# Patient Record
Sex: FEMALE | Race: WHITE | NOT HISPANIC OR LATINO | URBAN - METROPOLITAN AREA
[De-identification: names, ages, dates, MRNs, and addresses within clinical notes are randomized per-mention and may not be internally consistent; named-entity substitution may affect disease eponyms.]

---

## 2022-09-21 ENCOUNTER — EMERGENCY (EMERGENCY)
Facility: HOSPITAL | Age: 27
LOS: 1 days | Discharge: HOME | End: 2022-09-21
Attending: EMERGENCY MEDICINE
Payer: MEDICAID

## 2022-09-21 VITALS
OXYGEN SATURATION: 99 % | RESPIRATION RATE: 18 BRPM | HEART RATE: 87 BPM | SYSTOLIC BLOOD PRESSURE: 114 MMHG | DIASTOLIC BLOOD PRESSURE: 78 MMHG

## 2022-09-21 VITALS
TEMPERATURE: 99 F | RESPIRATION RATE: 16 BRPM | SYSTOLIC BLOOD PRESSURE: 111 MMHG | DIASTOLIC BLOOD PRESSURE: 70 MMHG | HEIGHT: 68 IN | WEIGHT: 132.28 LBS | OXYGEN SATURATION: 98 % | HEART RATE: 86 BPM

## 2022-09-21 LAB
ALBUMIN SERPL ELPH-MCNC: 4.5 G/DL — SIGNIFICANT CHANGE UP (ref 3.3–5)
ALP SERPL-CCNC: 73 U/L — SIGNIFICANT CHANGE UP (ref 40–120)
ALT FLD-CCNC: 17 U/L — SIGNIFICANT CHANGE UP (ref 10–45)
ANION GAP SERPL CALC-SCNC: 10 MMOL/L — SIGNIFICANT CHANGE UP (ref 5–17)
AST SERPL-CCNC: 16 U/L — SIGNIFICANT CHANGE UP (ref 10–40)
BASOPHILS # BLD AUTO: 0.02 K/UL — SIGNIFICANT CHANGE UP (ref 0–0.2)
BASOPHILS NFR BLD AUTO: 0.3 % — SIGNIFICANT CHANGE UP (ref 0–2)
BILIRUB SERPL-MCNC: 0.3 MG/DL — SIGNIFICANT CHANGE UP (ref 0.2–1.2)
BUN SERPL-MCNC: 11 MG/DL — SIGNIFICANT CHANGE UP (ref 7–23)
CALCIUM SERPL-MCNC: 9.2 MG/DL — SIGNIFICANT CHANGE UP (ref 8.4–10.5)
CHLORIDE SERPL-SCNC: 104 MMOL/L — SIGNIFICANT CHANGE UP (ref 96–108)
CO2 SERPL-SCNC: 25 MMOL/L — SIGNIFICANT CHANGE UP (ref 22–31)
CREAT SERPL-MCNC: 0.66 MG/DL — SIGNIFICANT CHANGE UP (ref 0.5–1.3)
EGFR: 125 ML/MIN/1.73M2 — SIGNIFICANT CHANGE UP
EOSINOPHIL # BLD AUTO: 0.2 K/UL — SIGNIFICANT CHANGE UP (ref 0–0.5)
EOSINOPHIL NFR BLD AUTO: 3 % — SIGNIFICANT CHANGE UP (ref 0–6)
FLUAV AG NPH QL: SIGNIFICANT CHANGE UP
FLUBV AG NPH QL: SIGNIFICANT CHANGE UP
GLUCOSE SERPL-MCNC: 107 MG/DL — HIGH (ref 70–99)
HCG SERPL-ACNC: <2 MIU/ML — SIGNIFICANT CHANGE UP
HCT VFR BLD CALC: 38.5 % — SIGNIFICANT CHANGE UP (ref 34.5–45)
HGB BLD-MCNC: 12.9 G/DL — SIGNIFICANT CHANGE UP (ref 11.5–15.5)
IMM GRANULOCYTES NFR BLD AUTO: 0.3 % — SIGNIFICANT CHANGE UP (ref 0–0.9)
LYMPHOCYTES # BLD AUTO: 2.78 K/UL — SIGNIFICANT CHANGE UP (ref 1–3.3)
LYMPHOCYTES # BLD AUTO: 41.7 % — SIGNIFICANT CHANGE UP (ref 13–44)
MCHC RBC-ENTMCNC: 29.7 PG — SIGNIFICANT CHANGE UP (ref 27–34)
MCHC RBC-ENTMCNC: 33.5 GM/DL — SIGNIFICANT CHANGE UP (ref 32–36)
MCV RBC AUTO: 88.5 FL — SIGNIFICANT CHANGE UP (ref 80–100)
MONOCYTES # BLD AUTO: 0.34 K/UL — SIGNIFICANT CHANGE UP (ref 0–0.9)
MONOCYTES NFR BLD AUTO: 5.1 % — SIGNIFICANT CHANGE UP (ref 2–14)
NEUTROPHILS # BLD AUTO: 3.31 K/UL — SIGNIFICANT CHANGE UP (ref 1.8–7.4)
NEUTROPHILS NFR BLD AUTO: 49.6 % — SIGNIFICANT CHANGE UP (ref 43–77)
NRBC # BLD: 0 /100 WBCS — SIGNIFICANT CHANGE UP (ref 0–0)
PLATELET # BLD AUTO: 249 K/UL — SIGNIFICANT CHANGE UP (ref 150–400)
POTASSIUM SERPL-MCNC: 4 MMOL/L — SIGNIFICANT CHANGE UP (ref 3.5–5.3)
POTASSIUM SERPL-SCNC: 4 MMOL/L — SIGNIFICANT CHANGE UP (ref 3.5–5.3)
PROT SERPL-MCNC: 7.2 G/DL — SIGNIFICANT CHANGE UP (ref 6–8.3)
RBC # BLD: 4.35 M/UL — SIGNIFICANT CHANGE UP (ref 3.8–5.2)
RBC # FLD: 12.2 % — SIGNIFICANT CHANGE UP (ref 10.3–14.5)
RSV RNA NPH QL NAA+NON-PROBE: SIGNIFICANT CHANGE UP
SARS-COV-2 RNA SPEC QL NAA+PROBE: SIGNIFICANT CHANGE UP
SODIUM SERPL-SCNC: 139 MMOL/L — SIGNIFICANT CHANGE UP (ref 135–145)
WBC # BLD: 6.67 K/UL — SIGNIFICANT CHANGE UP (ref 3.8–10.5)
WBC # FLD AUTO: 6.67 K/UL — SIGNIFICANT CHANGE UP (ref 3.8–10.5)

## 2022-09-21 PROCEDURE — 70450 CT HEAD/BRAIN W/O DYE: CPT | Mod: MA

## 2022-09-21 PROCEDURE — 87637 SARSCOV2&INF A&B&RSV AMP PRB: CPT

## 2022-09-21 PROCEDURE — 84702 CHORIONIC GONADOTROPIN TEST: CPT

## 2022-09-21 PROCEDURE — 70450 CT HEAD/BRAIN W/O DYE: CPT | Mod: 26,MA

## 2022-09-21 PROCEDURE — 99285 EMERGENCY DEPT VISIT HI MDM: CPT | Mod: 25

## 2022-09-21 PROCEDURE — 93005 ELECTROCARDIOGRAM TRACING: CPT

## 2022-09-21 PROCEDURE — 85025 COMPLETE CBC W/AUTO DIFF WBC: CPT

## 2022-09-21 PROCEDURE — 96374 THER/PROPH/DIAG INJ IV PUSH: CPT

## 2022-09-21 PROCEDURE — 99285 EMERGENCY DEPT VISIT HI MDM: CPT | Mod: GC

## 2022-09-21 PROCEDURE — 99285 EMERGENCY DEPT VISIT HI MDM: CPT

## 2022-09-21 PROCEDURE — 80053 COMPREHEN METABOLIC PANEL: CPT

## 2022-09-21 RX ORDER — DIAZEPAM 5 MG
5 TABLET ORAL ONCE
Refills: 0 | Status: DISCONTINUED | OUTPATIENT
Start: 2022-09-21 | End: 2022-09-21

## 2022-09-21 RX ORDER — SODIUM CHLORIDE 9 MG/ML
1000 INJECTION INTRAMUSCULAR; INTRAVENOUS; SUBCUTANEOUS ONCE
Refills: 0 | Status: COMPLETED | OUTPATIENT
Start: 2022-09-21 | End: 2022-09-21

## 2022-09-21 RX ORDER — METOCLOPRAMIDE HCL 10 MG
10 TABLET ORAL ONCE
Refills: 0 | Status: COMPLETED | OUTPATIENT
Start: 2022-09-21 | End: 2022-09-21

## 2022-09-21 RX ADMIN — Medication 10 MILLIGRAM(S): at 13:53

## 2022-09-21 RX ADMIN — Medication 5 MILLIGRAM(S): at 15:57

## 2022-09-21 RX ADMIN — SODIUM CHLORIDE 2000 MILLILITER(S): 9 INJECTION INTRAMUSCULAR; INTRAVENOUS; SUBCUTANEOUS at 13:53

## 2022-09-21 NOTE — ED PROVIDER NOTE - OBJECTIVE STATEMENT
This is a 27-year-old female otherwise well no past medical history.  She is coming in with right-sided headache for about 2 to 3 weeks not sudden onset.  Since then she has had occasional palpitations and feels like her gait is unstable.  She feels like she is leaning to 1 side.  She sometimes gets tingling feeling in all 4 extremities when her palpitations are happening.  There is no chest pain or shortness of breath.  There is no leg swelling.  She is never had this before.  No head injury or trauma.  She feels like there might be a little bump underneath the skin of her scalp.  The pain is moderate to severe intermittent and pulsating. This is a 27-year-old female otherwise well no past medical history.  She is coming in with right-sided headache for about 2 to 3 weeks not sudden onset.  Since then she has had occasional palpitations and feels like her gait is unstable.  She feels like she is leaning to 1 side.  She sometimes gets tingling feeling in all 4 extremities when her palpitations are happening.  There is no chest pain or shortness of breath.  There is no leg swelling.  She is never had this before.  No head injury or trauma.  She feels like there might be a little bump underneath the skin of her scalp.  The pain is moderate to severe intermittent and pulsating. No double vision or trouble swallowing. No neck injury.

## 2022-09-21 NOTE — ED PROVIDER NOTE - CARE PROVIDER_API CALL
Brennan Acuna)  Neurology; Neurophysiology  [address], [address]  [place] [address]  Phone: [phone]  Fax: [phone]  Follow Up Time: 4-6 Days

## 2022-09-21 NOTE — CONSULT NOTE ADULT - SUBJECTIVE AND OBJECTIVE BOX
Neurology - Consult Note    -  Spectra: [id number] ([facility]), [id number] ([facility])  -    HPI: Patient FERCHO VASQUEZ is a 27y (1995) wo/man with no significant PMH except TMJ? has presented to the ED due to 4-5 months of episodes of "dizziness" (actually lightheadedness) with associated R ear pressure/pain, tingling in extremities (bilateral toes to knees, bilateral fingers to neck) and bilateral blurry vision. Pt states that his lightheadedness gets worse with any changes in position (sitting up, also to the left and to the right). States she came in now because of worsening of symptoms. Has been having trouble ambulating. Pt currently denies any significant HA, fever/chills, n/v, numbness, tingling, focal weakness, changes in vision, hearing loss, tinnitus head trauma.       Review of Systems:    CONSTITUTIONAL: No fevers or chills  EYES AND ENT: No visual changes or no throat pain   NECK: No pain or stiffness  RESPIRATORY: No hemoptysis or shortness of breath  NEUROLOGICAL: +As stated in HPI above  SKIN: No itching, burning, rashes, or lesions   All other review of systems is negative unless indicated above.    Allergies:      PMHx/PSHx/Family Hx: As above, otherwise see below       Social Hx:  No current use of tobacco, alcohol, or illicit drugs  Lives with ***    Medications:  MEDICATIONS  (STANDING):    MEDICATIONS  (PRN):      Vitals:  T(C): 37.1 (09-21-22 @ 09:48), Max: 37.1 (09-21-22 @ 09:48)  HR: 87 (09-21-22 @ 15:30) (86 - 88)  BP: 114/78 (09-21-22 @ 15:30) (111/70 - 114/78)  RR: 18 (09-21-22 @ 15:30) (16 - 18)  SpO2: 99% (09-21-22 @ 15:30) (98% - 99%)    Physical Examination:   General - NAD  Cardiovascular - Peripheral pulses palpable, no edema  Eyes - Fundoscopy not performed due to safety precautions in the setting of the COVID-19 pandemic    Neurologic Exam:  Mental status - Awake, Alert, Oriented to person, place, and time. Speech fluent, repetition and naming intact. Follows simple and complex commands. Attention/concentration, recent and remote memory (including registration and recall), and fund of knowledge intact    Cranial nerves - PERRLA, VFF, EOMI, face sensation (V1-V3) intact b/l, facial strength intact without asymmetry b/l, hearing intact b/l, palate with symmetric elevation, trapezius OR sternocleidomastiod 5/5 strength b/l, tongue midline on protrusion with full lateral movement. Subjective R ear full ness.   Head impulse positive to left     Motor - Normal bulk and tone throughout. No pronator drift.  Strength testing            Deltoid      Biceps      Triceps     Wrist Extension    Wrist Flexion     Interossei         R            5                 5               5                     5                              5                        5                 5  L             5                 5               5                     5                              5                        5                 5              Hip Flexion    Hip Extension    Knee Flexion    Knee Extension    Dorsiflexion    Plantar Flexion  R              5                           5                       5                           5                            5                          5  L              5                           5                        5                           5                            5                          5    Sensation - Intact to light touch in all extremities    DTR's -             Biceps      Triceps     Brachioradialis      Patellar    Ankle    Toes/plantar response  R             2+             2+                  2+                       2+            2+                 Down  L              2+             2+                 2+                        2+           2+                 Down    Coordination - Finger to Nose intact b/l.     Gait and station - Deferred due to fall risk  Labs:                        12.9   6.67  )-----------( 249      ( 21 Sep 2022 11:08 )             38.5     09-21    139  |  104  |  11  ----------------------------<  107<H>  4.0   |  25  |  0.66    Ca    9.2      21 Sep 2022 11:08    TPro  7.2  /  Alb  4.5  /  TBili  0.3  /  DBili  x   /  AST  16  /  ALT  17  /  AlkPhos  73  09-21    CAPILLARY BLOOD GLUCOSE        LIVER FUNCTIONS - ( 21 Sep 2022 11:08 )  Alb: 4.5 g/dL / Pro: 7.2 g/dL / ALK PHOS: 73 U/L / ALT: 17 U/L / AST: 16 U/L / GGT: x               CSF:                  Radiology:  CT Head No Cont:  (21 Sep 2022 12:10)      FINDINGS:    There is no acute intracranial hemorrhage or major vascular distribution   infarction.    Linear hypoattenuation overlying the aris, artifactual due to calvaria   beam hardening.    There is no edema or midline shift. The basal cisterns are patent.  There is no hydrocephalus.  No extra-axial collection is seen.    Normal pituitary gland configuration. Nonspecific pineal gland   calcifications.  No cerebellar tonsillar ectopia/herniation.    The visualized orbits and mastoid/middle ear cavities are clear.   Partially imaged paranasal sinuses are clear.    No acute calvarial fracture is identified.      IMPRESSION:    No CT evidence for acute intracranial hemorrhage, territorial infarction   or mass effect. If the patient has persistent symptoms and/or new focal   neurologic deficits, consider further evaluation with MRI.   Neurology - Consult Note    -  Spectra: [id number] ([facility]), [id number] ([facility])  -    HPI: Patient FERCHO VSAQUEZ is a 27y (1995) wo/man with no significant PMH except TMJ? has presented to the ED due to 4-5 months of episodes of "dizziness" (actually lightheadedness) with associated R ear pressure/pain, tingling in extremities (bilateral toes to knees, bilateral fingers to neck) and bilateral blurry vision. Pt states that his lightheadedness gets worse with any changes in position (sitting up, also to the left and to the right). States she came in now because of worsening of symptoms. Has been having trouble ambulating. Pt currently denies any significant HA, fever/chills, n/v, numbness, tingling, focal weakness, changes in vision, hearing loss, tinnitus head trauma.       Review of Systems:    CONSTITUTIONAL: No fevers or chills  EYES AND ENT: No visual changes or no throat pain   NECK: No pain or stiffness  RESPIRATORY: No hemoptysis or shortness of breath  NEUROLOGICAL: +As stated in HPI above  SKIN: No itching, burning, rashes, or lesions   All other review of systems is negative unless indicated above.    Allergies: NKDA      PMHx/PSHx/Family Hx: As above, otherwise see below       Social Hx:  No current use of tobacco, alcohol, or illicit drugs    Medications:  MEDICATIONS  (STANDING):    MEDICATIONS  (PRN):      Vitals:  T(C): 37.1 (09-21-22 @ 09:48), Max: 37.1 (09-21-22 @ 09:48)  HR: 87 (09-21-22 @ 15:30) (86 - 88)  BP: 114/78 (09-21-22 @ 15:30) (111/70 - 114/78)  RR: 18 (09-21-22 @ 15:30) (16 - 18)  SpO2: 99% (09-21-22 @ 15:30) (98% - 99%)    Physical Examination:   General - NAD  Cardiovascular - Peripheral pulses palpable, no edema  Eyes - Fundoscopy not performed due to safety precautions in the setting of the COVID-19 pandemic    Neurologic Exam:  Mental status - Awake, Alert, Oriented to person, place, and time. Speech fluent, repetition and naming intact. Follows simple and complex commands. Attention/concentration, recent and remote memory (including registration and recall), and fund of knowledge intact    Cranial nerves - PERRLA, VFF, EOMI, face sensation (V1-V3) intact b/l, facial strength intact without asymmetry b/l, hearing intact b/l, palate with symmetric elevation, trapezius 5/5 strength b/l, tongue midline on protrusion with full lateral movement. Subjective R ear full ness.   Head impulse positive to left     Motor - Normal bulk and tone throughout. No pronator drift.  Strength testing            Deltoid      Biceps      Triceps     Wrist Extension    Wrist Flexion     Interossei         R            5                 5               5                     5                              5                        5                 5  L             5                 5               5                     5                              5                        5                 5              Hip Flexion    Hip Extension    Knee Flexion    Knee Extension    Dorsiflexion    Plantar Flexion  R              5                           5                       5                           5                            5                          5  L              5                           5                        5                           5                            5                          5    Sensation - Intact to light touch in all extremities    DTR's -             Biceps      Triceps     Brachioradialis      Patellar    Ankle    Toes/plantar response  R             2+             2+                  2+                       2+            2+                 Down  L              2+             2+                 2+                        2+           2+                 Down    Coordination - Finger to Nose intact b/l.     Gait and station - Not assessed due to fall risk in the ED  Labs:                        12.9   6.67  )-----------( 249      ( 21 Sep 2022 11:08 )             38.5     09-21    139  |  104  |  11  ----------------------------<  107<H>  4.0   |  25  |  0.66    Ca    9.2      21 Sep 2022 11:08    TPro  7.2  /  Alb  4.5  /  TBili  0.3  /  DBili  x   /  AST  16  /  ALT  17  /  AlkPhos  73  09-21    CAPILLARY BLOOD GLUCOSE        LIVER FUNCTIONS - ( 21 Sep 2022 11:08 )  Alb: 4.5 g/dL / Pro: 7.2 g/dL / ALK PHOS: 73 U/L / ALT: 17 U/L / AST: 16 U/L / GGT: x               CSF:                  Radiology:  CT Head No Cont:  (21 Sep 2022 12:10)      FINDINGS:    There is no acute intracranial hemorrhage or major vascular distribution   infarction.    Linear hypoattenuation overlying the aris, artifactual due to calvaria   beam hardening.    There is no edema or midline shift. The basal cisterns are patent.  There is no hydrocephalus.  No extra-axial collection is seen.    Normal pituitary gland configuration. Nonspecific pineal gland   calcifications.  No cerebellar tonsillar ectopia/herniation.    The visualized orbits and mastoid/middle ear cavities are clear.   Partially imaged paranasal sinuses are clear.    No acute calvarial fracture is identified.      IMPRESSION:    No CT evidence for acute intracranial hemorrhage, territorial infarction   or mass effect. If the patient has persistent symptoms and/or new focal   neurologic deficits, consider further evaluation with MRI.

## 2022-09-21 NOTE — ED PROVIDER NOTE - NSFOLLOWUPINSTRUCTIONS_ED_ALL_ED_FT
yes
IMPORTANT INSTRUCTIONS FROM Dr. GALLARDO:    Please follow up with your personal medical doctor in 24-48 hours. See neurologist within 1 week.   Bring results from today to your visit.    If you were advised to take any medications - be sure to review the package insert.    If your symptoms change, get worse or if you have any new symptoms, come to the ER right away.  If you have any questions, call the ER at the phone number on this page.

## 2022-09-21 NOTE — ED PROVIDER NOTE - PATIENT PORTAL LINK FT
You can access the FollowMyHealth Patient Portal offered by [facility] by registering at the following website: [email or web address]. By joining [facility]’s FollowMyHealth portal, you will also be able to view your health information using other applications (apps) compatible with our system.

## 2022-09-21 NOTE — ED ADULT NURSE NOTE - OBJECTIVE STATEMENT
27 y f came to the ed c/o dizziness and tingling in her hands for about 1 month. states she feels more dizzy when changing positions. when laying flat and sitting upright here in the ED patient became dizzy. patient is able to ambulate without assistance. also endorses palpitations. patient is a/ox3. denies fevers, chills, chest pain, sob. skin is warm and dry.

## 2022-09-21 NOTE — CONSULT NOTE ADULT - CONSULT REASON
intermittent episodes of "dizziness", extremities paresthesias, R ear pressure/pain with jaw clicking, bilateral blurry vision

## 2022-09-21 NOTE — ED PROVIDER NOTE - PROGRESS NOTE DETAILS
pt seen by neuro. dr burns. he rec for outpt f/u as pt duration of symptoms make this less worrisome and can see his partner in office for further w/u and care. feeling better from headache but still dizzy and wants to go home, reviewed case and results w pt, questions answered and pt understands, advised return precautions and care plan.

## 2022-09-21 NOTE — ED PROVIDER NOTE - CLINICAL SUMMARY MEDICAL DECISION MAKING FREE TEXT BOX
Is unclear the cause of the patient's symptoms.  Given the lengthy duration of the headache and the fact that she does not have a known history of migraines with associated reported gait instability.  We will do a CT scan to check for ICH.  I do not suspect subarachnoid given the nature of the symptoms.  Basic blood work CT head check for pregnancy symptomatic treatment for headaches EKG.

## 2022-09-21 NOTE — ED PROVIDER NOTE - NEUROLOGICAL, MLM
Alert and oriented, no focal deficits, no motor or sensory deficits.The gait is steady.  There is no pronator drift. Alert and oriented, no focal deficits, no motor or sensory deficits.The gait is steady.  There is no pronator drift. No nystagmus.

## 2022-09-21 NOTE — CONSULT NOTE ADULT - ATTENDING COMMENTS
HPI as per resident note, personally verified by me. Patient with 4-5 months of dizziness/lightheadedness that is worse with movement and sitting up and improving with lying down and not moving. She also has intermittent "pressure" in R ear and jaw and headache on that side. Has "clicking" sound in jaw. Also reports intermittent blurry vision b/l and tingling in her extremities in a stocking/glove distribution. Denies any rash over her R ear, discharge, or fevers.    Neurologic exam as per resident note with additions as below:  AAO x3, speech fluent  CN's II-XII intact. HINTS weakly (+) to the L  Strength 5/5 all  Sensation intact all  2+ all except for 2+ and brisk R KJ, downgoing b/l plantar response  FtN intact b/l  R masseter muscle with some crepitus and tenderness to palpation    A/P:  Dizziness  Headache  Blurry vision  Skin sensation disturbance    - Etiology for symptoms seems most consistent with peripheral etiology from R ear/side possibly due to toxic/metabolic, infectious, musculoskeletal, or inflammatory cause. No focal neurologic deficits on exam, unrevealing CT head, and headache reported lacks a positional quality that is worse when lying down so doubt cerebrovascular causes or venous sinus thrombosis. Could still consider acoustic neuroma but would not expect this to cause ear pain (may be referred from TMJ) and no reported hearing loss  - Orthostatic vital signs  - Check labs for additional causes with B12, folate, TSH, free T4, Vit D 25 OH, B1, B6, copper, ESR, CRP, Lyme, HIV; she can follow-up results as outpatient  - No absolute contraindications to discharge if patient is otherwise medically stable. If she remains in hospital for symptom control would get MRI brain w/ and w/o with thin cuts through IAC, otherwise can be done as outpatient  - Recommend outpatient follow-up with Brennan Acuna (neurologist, [phone]) as well as ENT  - Will sign off, please call with additional questions or concerns

## 2022-09-21 NOTE — CONSULT NOTE ADULT - ASSESSMENT
HPI: A 27 year old female with no significant PMH except TMJ? has presented to the ED due to 4-5 months of episodes of "dizziness" (actually lightheadedness) with associated R ear pressure/pain, tingling in extremities (bilateral toes to knees, bilateral fingers to neck) and bilateral blurry vision. Pt states that his lightheadedness gets worse with any changes in position (sitting up, also to the left and to the right). States she came in now because of worsening of symptoms. Has been having trouble ambulating. Pt currently denies any significant HA, n/v, fever/chills, numbness, tingling, focal weakness, changes in vision, hearing loss, tinnitus head trauma.     CT head: No CT evidence for acute intracranial hemorrhage, territorial infarction or mass effect. If the patient has persistent symptoms and/or new focal neurologic deficits, consider further evaluation with MRI.    Neuro exam nonfocal. Subjective R ear full ness. Head impulse positive to left.    Impression: Pt's symptoms of unclear etiology at this time. Pt may benefit the most from ENT evaluation for further recommendations.     Recommendations:  No further inpatient neurological work up recommended at this time.  Orthostatics  If pt symptomatically improves in the ED, may consider outpatient follow up with ENT and neurology.  If ends up staying in the hospital, may obtain MRI brain w and w/o contrast with cuts in IAC and MRV head with contrast    Patient can follow up with Dr. Acuna after discharge. Please instruct the patient to call [phone] to schedule an appointment. Office is located at [address] [place] [address], [place] [address].   Case seen and discussed with neurologist Dr. Monroy HPI: A 27 year old female with no significant PMH except TMJ? has presented to the ED due to 4-5 months of episodes of "dizziness" (actually lightheadedness) with associated R ear pressure/pain, tingling in extremities (bilateral toes to knees, bilateral fingers to neck) and bilateral blurry vision. Pt states that his lightheadedness gets worse with any changes in position (sitting up, also to the left and to the right). States she came in now because of worsening of symptoms. Has been having trouble ambulating. Pt currently denies any significant HA, n/v, fever/chills, numbness, tingling, focal weakness, changes in vision, hearing loss, tinnitus head trauma.     CT head: No CT evidence for acute intracranial hemorrhage, territorial infarction or mass effect. If the patient has persistent symptoms and/or new focal neurologic deficits, consider further evaluation with MRI.    Neuro exam nonfocal. Subjective R ear full ness. Head impulse positive to left.    Impression: Pt's symptoms of unclear etiology at this time, but possibly of inner ear pathology?  Pt may benefit the most from ENT evaluation for further recommendations.     Recommendations:  No further inpatient neurological work up recommended at this time.  Orthostatics  If pt symptomatically improves in the ED, may consider outpatient follow up with ENT and neurology.  If ends up staying in the hospital, may obtain MRI brain w and w/o contrast with cuts in IAC and MRV head with contrast    Patient can follow up with Dr. Acuna after discharge. Please instruct the patient to call [phone] to schedule an appointment. Office is located at [address], [place] [address].   Case seen and discussed with neurologist Dr. Monroy HPI: A 27 year old female with no significant PMH except TMJ? has presented to the ED due to 4-5 months of episodes of "dizziness" (actually lightheadedness) with associated R ear pressure/pain, tingling in extremities (bilateral toes to knees, bilateral fingers to neck) and bilateral blurry vision. Pt states that his lightheadedness gets worse with any changes in position (sitting up, also to the left and to the right). States she came in now because of worsening of symptoms. Has been having trouble ambulating. Pt currently denies any significant HA, n/v, fever/chills, numbness, tingling, focal weakness, changes in vision, hearing loss, tinnitus head trauma.     CT head: No CT evidence for acute intracranial hemorrhage, territorial infarction or mass effect. If the patient has persistent symptoms and/or new focal neurologic deficits, consider further evaluation with MRI.    Neuro exam nonfocal. Subjective R ear full ness. Head impulse positive to left.    Impression: Pt's symptoms of unclear etiology at this time, but possibly of right ear pathology? vs orthostatics vs. something else.  Pt may benefit the most from ENT evaluation for further recommendations.     Recommendations:  No further inpatient neurological work up recommended at this time.  Orthostatics  If pt symptomatically improves in the ED, may consider outpatient follow up with ENT and neurology.  If ends up staying in the hospital, may obtain MRI brain w and w/o contrast with cuts in IAC and MRV head with contrast (MRV may be of lower utility)    Patient can follow up with Dr. Acuna after discharge. Please instruct the patient to call [phone] to schedule an appointment. Office is located at [address] [place] [address].   Case seen and discussed with neurologist Dr. Monroy